# Patient Record
Sex: FEMALE | Race: WHITE | URBAN - METROPOLITAN AREA
[De-identification: names, ages, dates, MRNs, and addresses within clinical notes are randomized per-mention and may not be internally consistent; named-entity substitution may affect disease eponyms.]

---

## 2023-04-18 ENCOUNTER — OFFICE VISIT (OUTPATIENT)
Dept: URGENT CARE | Age: 28
End: 2023-04-18

## 2023-04-18 VITALS
SYSTOLIC BLOOD PRESSURE: 102 MMHG | OXYGEN SATURATION: 97 % | BODY MASS INDEX: 20.73 KG/M2 | HEART RATE: 61 BPM | RESPIRATION RATE: 16 BRPM | DIASTOLIC BLOOD PRESSURE: 70 MMHG | WEIGHT: 117 LBS | HEIGHT: 63 IN | TEMPERATURE: 97.9 F

## 2023-04-18 DIAGNOSIS — H00.015 HORDEOLUM EXTERNUM LEFT LOWER EYELID: Primary | ICD-10-CM

## 2023-04-18 RX ORDER — ERYTHROMYCIN 5 MG/G
OINTMENT OPHTHALMIC
Qty: 1 G | Refills: 0 | Status: SHIPPED | OUTPATIENT
Start: 2023-04-18 | End: 2023-04-28

## 2023-04-18 ASSESSMENT — ENCOUNTER SYMPTOMS
EYE REDNESS: 1
EYE PAIN: 1
EYE DISCHARGE: 0
EYE ITCHING: 0
PHOTOPHOBIA: 0

## 2023-04-18 NOTE — PATIENT INSTRUCTIONS
Erythromycin eye ointment three times daily  Continue warm compresses  Follow up with optometrist if does not resolve or worsens for drainage

## 2023-04-18 NOTE — PROGRESS NOTES
Carole Mckay (:  1995) is a 24 y.o. female,New patient, here for evaluation of the following chief complaint(s):  Eye Problem (Left eye, swelling, painful, redness )      ASSESSMENT/PLAN:  1. Hordeolum externum left lower eyelid  Erythromycin eye ointment three times daily  Continue warm compresses  Follow up with optometrist if does not resolve or worsens for drainage  Vision Screening    Right eye Left eye Both eyes   Without correction 20/80 -1 20/50 20/40   With correction      Patient was not wearing her glasses    - erythromycin (ROMYCIN) 5 MG/GM ophthalmic ointment; Apply to left lower lid three times daily fo 5 days  Dispense: 1 g; Refill: 0  - Visual acuity screening       Return if symptoms worsen or fail to improve. SUBJECTIVE/OBJECTIVE:  Patient comes in today for stye to left lower eye lid for 3 days. No vision changes, wears glasses. Warm compresses have not been helping at home. Denies drainage. History provided by:  Patient   used: No    Eye Problem   Associated symptoms include eye redness. Pertinent negatives include no eye discharge, itching or photophobia. Vitals:    23 1146   BP: 102/70   Pulse: 61   Resp: 16   Temp: 97.9 °F (36.6 °C)   SpO2: 97%   Weight: 117 lb (53.1 kg)   Height: 5' 3\" (1.6 m)       Review of Systems   Eyes:  Positive for pain and redness. Negative for photophobia, discharge, itching and visual disturbance. All other systems reviewed and are negative. Physical Exam  Vitals reviewed. Constitutional:       Appearance: Normal appearance. HENT:      Head: Normocephalic and atraumatic. Eyes:      General:         Left eye: Hordeolum (lower lid) present. Extraocular Movements: Extraocular movements intact. Conjunctiva/sclera: Conjunctivae normal.      Pupils: Pupils are equal, round, and reactive to light. Cardiovascular:      Rate and Rhythm: Normal rate.    Pulmonary:      Effort: Pulmonary effort is normal.